# Patient Record
(demographics unavailable — no encounter records)

---

## 2025-07-16 NOTE — IMAGING
[de-identified] : right index finger P2 ulnar sided mass < 1 cm that is soft, supple and nttp. mass is mobile. all digits are nvi with FAROM.   and intrinsic strength is 5/5  7/16/2025- right index finger 3 view x-ray showed: no acute changes, no calcifications or radioopqaue masses

## 2025-07-16 NOTE — ASSESSMENT
[FreeTextEntry1] : The patient was advised of the diagnosis. The natural history of the pathology was explained in full to the patient in layman's terms. All questions were answered. The risks and benefits of surgical and non-surgical treatment alternatives were explained in full to the patient.  We reviewed the pathology and treatment options- patient aware that options include observation, aspiration, surgery- aspiration with 50/50 chance of resolution, surgery usually 95-97% effective, although some studies indicate that recurrence may be as high as 25%. Today the patient is amenable to surgical excision,  Understands that with volar masses chance of arterial injury which may result in ecchymosis and occasional hematoma, recurrence, stiffness due to dissection to the joint capsule as well as general risks of surgery- bleeding, infection, injury to nerves, vessels or tendons, need for future surgery, loss of limb, loss of life. all questions answered and patient agrees to the surgical plan.  post op protocol and expectations were reviewed.   Plan for right index finger mass excision  RTO after surgery

## 2025-07-16 NOTE — HISTORY OF PRESENT ILLNESS
----- Message from Luna DÍAZ sent at 10/3/2024  7:26 AM CDT -----  Regarding: Mammo Order  Patient is scheduled for a mammogram on 10/10/2024.    Would you please put an order in [facility] for a Mammo Screening [facility]  with a diagnosis of Encounter for Screening Mammo (Z12.31)?  Also please choose Perform Add'l Diag Tests and/or Interventional Proc Per Radiologist so we can do any further testing needed.    Thank you for your help!   [de-identified] : 7/16/2025: right index finger with ulnar sided mass over the P2 region x 3 yrs. Pt believes this is related to a previous bite from a puppy.  PMH: denied Allergies: NKDA